# Patient Record
(demographics unavailable — no encounter records)

---

## 2024-10-09 NOTE — PHYSICAL EXAM
[No Acute Distress] : in no acute distress [Well developed] : well developed [Well Nourished] : ~L well nourished [Good Hygeine] : demonstrates good hygeine [Oriented x3] : oriented to person, place, and time [Normal Mood/Affect] : mood and affect are normal [Anxiety] : patient is anxious [Warm and Dry] : was warm and dry to touch [Normal Gait] : gait was abnormal [Normal] : was normal [Atrophy] : atrophy [de-identified] : ambulates with a cane [de-identified] : bilateral white plaques

## 2024-10-09 NOTE — HISTORY OF PRESENT ILLNESS
[FreeTextEntry1] : Derian is an 85-year-old with known history of recurrent UTI on Methenamine and Vitamin C.  At the time of her initial appointment in September 2023 it had been recommended she start transvaginal estrogen treatment; however, she ever did.  Derian presents today for follow-up with complaints of 1 day of mild dysuria.  She has baseline external pruritis/burning and as per GYN note in April 2023, has been diagnosed with lichen sclerosis.  No fevers or chills.

## 2024-10-09 NOTE — PROCEDURE
[Straight Catheterization] : insertion of a straight catheter [Urinary Tract Infection] : a urinary tract infection [Patient] : the patient [Allergies Reviewed] : Allergies reviewed [None] : none [___ Fr Straight Tip] : a [unfilled] in [place] straight tip catheter [Cloudy] : cloudy [Culture] : culture [Urinalysis] : urinalysis [No Complications] : no complications

## 2024-10-09 NOTE — DISCUSSION/SUMMARY
[FreeTextEntry1] : #Suspected UTI: -Udip with trace blood, moderate leukocytes -Follow-up formal UCx -Given symptoms and positive Udip, will treat empirically with 5-day course of Nitrofurantoin.  Allergies reviewed. -Signs and symptoms of worsening UTI reviewed.  These include but are not limited to fevers, chills, blood in the urine, and flank pain.  #Recurrent UTI: -Continue with Methenamine and Vitamin C BID -Resume estrogen tablet use; written instructions provided -She will hold the Methenamine while undergoing acute UTI treatment.  OK to continue the vitamin C.  #Lichen Sclerosis: -Rx for Clobetasol Propionate 0.05% external ointment sent to the patient's pharmacy on file.  She will apply this cream to the affected are for 12 weeks then as needed thereafter.   -Patient will RTO in 12 weeks for recheck; if no improvement, will consider vulvar biopsy.  All questions answered to the patient's satisfaction.  She expressed understanding. She knows to contact the office with any questions or concerns.

## 2024-10-09 NOTE — PHYSICAL EXAM
[No Acute Distress] : in no acute distress [Well developed] : well developed [Well Nourished] : ~L well nourished [Good Hygeine] : demonstrates good hygeine [Oriented x3] : oriented to person, place, and time [Normal Mood/Affect] : mood and affect are normal [Anxiety] : patient is anxious [Warm and Dry] : was warm and dry to touch [Normal Gait] : gait was abnormal [Normal] : was normal [Atrophy] : atrophy [de-identified] : ambulates with a cane [de-identified] : bilateral white plaques

## 2024-10-09 NOTE — PROCEDURE
[Straight Catheterization] : insertion of a straight catheter [Urinary Tract Infection] : a urinary tract infection [Patient] : the patient [Allergies Reviewed] : Allergies reviewed [None] : none [___ Fr Straight Tip] : a [unfilled] in [place] straight tip catheter [Cloudy] : cloudy [Culture] : culture [Urinalysis] : urinalysis [No Complications] : no complications

## 2024-10-10 NOTE — DISCUSSION/SUMMARY
[de-identified] : I went over the pathophysiology of the patient's symptoms in great detail with the patient. I discussed the underlying pathophysiology of the patient's condition in great detail with the patient. I went over the patient's x-rays with them in great detail. I informed her that injections such as cortisone and Viscosupplementation are short term solutions, and that surgery will be required to provide them long term relief from their symptoms. I informed the patient that I no longer operate. I provided the names of Dr. Luis Daniel Doyle, Dr. Velez and Dr. Coffey.  All of their questions were answered. They understand and consent to the plan.   FU as needed.   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 Based upon the patient's continued symptoms and failure to respond to conservative treatment I have recommended a total knee replacement for this patient. A long discussion took place with the patient describing what a total joint replacement is and what the procedure would entail. A total knee model, similar to the implant that will be used during the operation was utilized to demonstrate and to discuss the various bearing surfaces of the implants. The hospitalization and post-operative care and rehabilitation were also discussed. The use of perioperative antibiotics and DVT prophylaxis were discussed. The risk, benefits and alternatives to a surgical intervention were discussed at length with the patient. The patient was also advised of risks related to the medical comorbidities and elevated body mass index (BMI). A lengthy discussion took place to review the most common complications including but not limited to: deep vein thrombosis, pulmonary embolus, heart attack, stroke, infection, wound breakdown, numbness, damage to nerves, tendon, muscles, arteries or other blood vessels, death and other possible complications from anesthesia. The patient was told that we will take steps to minimize these risks by using sterile technique, antibiotics and DVT prophylaxis when appropriate and follow the patient postoperatively in the office setting to monitor progress. The possibility of recurrent pain, no improvement in pain and actual worsening of pain were also discussed with the patient.  The discharge plan of care focused on the patient going home following surgery. I encouraged the patient to make the necessary arrangements to have someone stay with them when they are discharged home. Following discharge, a home care nurse will visit the patient. They will open your home care case and request home physical therapy services. Home physical therapy will commence following discharge provided it is appropriate and covered by her health insurance benefit plan.  The risks, benefits and alternative treatment options of total joint replacement were reviewed with the patient at great length. All questions were answered to the satisfaction of the patient. The patient participated in an interactive discussion of the total knee replacement implant planned for their surgery with questions answered. The patient agreed with the treatment plan, and has decided to move forward with the elective total knee replacement as planned.  TAN HARTMANN was seen face to face and needs a commode for bedside use as the patient has no ability to acces the bathroom in their home. The patient also requires a rolling walker as this is needed for activities of daily living within their home secondary to the diagnosis of osteoarthritis.

## 2024-10-10 NOTE — END OF VISIT
Detail Level: Zone [Time Spent: ___ minutes] : I have spent [unfilled] minutes of time on the encounter which excludes teaching and separately reported services.

## 2024-10-10 NOTE — ADDENDUM
[FreeTextEntry1] : I, ELENA JAMISON, acted solely as a scribe for Dr. Delgado Asencio on this date 10/10/2024.  All medical record entries made by the Scribe were at my, Dr. Delgado Asencio, direction and personally dictated by me on 10/10/2024. I have reviewed the chart and agree that the record accurately reflects my personal performance of the history, physical exam, assessment and plan. I have also personally directed, reviewed, and agreed with the chart.

## 2024-10-10 NOTE — HISTORY OF PRESENT ILLNESS
[de-identified] : 85 year old RHD female, presenting for initial evaluation with left knee pain x 3-4 years. Patient reports gradual onset of progressive worsening intermittent, non-radiating left knee pain, localized to the lateral aspect of her knee, described as "sharp" and rated 10/10 on the pain scale. Patient denies any associated swelling and buckling. Patient states that knee pain is worst with standing from a seated position and walking. Patient admits to taking Extra-Strength Tylenol, which provides pain relief. Patient denies any recent falls, trauma, or other injuries that precipitated the onset of worsening knee pain. Of note, patient had prior right knee meniscus repair with Dr. Asencio in 2018. Patient was previously advised will need left knee surgery for "bone on bone", however , she opted to continue conservative management at the time. Patient denies prior left knee surgery or intra-articular injections. Patient is not participating in PT at this time. Patient ambulates with a can for assistance and can perform ADL's independently. Patient denies any fever, chills, headache, dizziness, shortness of breath, chest pain, palpitations, abdominal pain, new onset urinary/bowel incontinence, saddle paresthesia, or other acute complaints at this time.

## 2024-10-10 NOTE — PHYSICAL EXAM
[de-identified] : Constitutional o Appearance : well-nourished, well developed, alert, in no acute distress  Head and Face o Head :  Inspection : atraumatic, normocephalic o Face :  Inspection : no visible rash or discoloration Respiratory o Respiratory Effort: breathing unlabored  Neurologic o Mental Status Examination :  Orientation : alert and oriented X 3 Psychiatric o Mood and Affect: mood normal, affect appropriate Cardiovascular o Observation/Palpation : - no swelling Lymphatic o Additional Nodes : No palpable lymph nodes present  Right Lower Extremity o Buttock : no tenderness, swelling or deformities  o Right Hip :  Inspection/Palpation : no tenderness, swelling or deformities  Range of Motion : full and painless in all planes, no crepitance  Stability : joint stability intact  Strength : extension, flexion, adduction, abduction, internal rotation and external rotation 5/5   o Right Knee :  Inspection/Palpation : no tenderness to palpation, no swelling  Range of Motion : active flexion and extension full and painless, no crepitance  Stability : no valgus or varus instability present on provocative testing  Strength : flexion and extension 5/5  Tests and Signs : negative Anterior Drawer, negative Lachman, negative Ayde  Left Lower Extremity o Buttock : no tenderness, swelling or deformities  o Left Hip :  Inspection/Palpation : no tenderness, no swelling or deformities  Range of Motion : full and painless in all planes, no crepitance  Stability : joint stability intact  Strength : extension, flexion, adduction, abduction, internal rotation and external rotation 5/5  o Left Knee :  Inspection/Palpation : medial compartment tenderness and lateral compartment tenderness to palpation, no swelling  Range of Motion : 5-130 lacks full extension no crepitance  Stability : no valgus or varus instability present on provocative testing  Strength : flexion and extension 5/5  Tests and Signs : negative Anterior Drawer, negative Lachman, negative Ayde  Gait and Station: Gait: varus deformity of both knees,  Patient presents ambulating with a cane, no significant extremity swelling or lymphedema, good proprioception and balance  Radiology Results 10/10/2024  o Left Knee : Standing AP, lateral, tunnel, and skyline views of the knee were obtained and reveal bone on bone in the medial compartment with severe patellofemoral arthritis

## 2024-10-18 NOTE — PLAN
[FreeTextEntry1] : 86 y/o  postmenopausal F presents for annual  HCM f/u pap mammo and DEXA scan referral given   Vulvar itching Describing importance of picking up medication Encouraged her to use medication and if still has symptoms to return for vulvar biopsy  Patient screened for depression- no signs of clinical depression. PHQ-9 scores reviewed over the course of the visit 5-10 minutes of face to face time. Follow up with changes in mood including other symptoms of anxiety

## 2024-10-18 NOTE — HISTORY OF PRESENT ILLNESS
[Patient reported mammogram was normal] : Patient reported mammogram was normal [Patient reported PAP Smear was normal] : Patient reported PAP Smear was normal [Patient reported colonoscopy was normal] : Patient reported colonoscopy was normal [Post-Menopause, No Sxs] : post-menopausal, currently without symptoms [Menopause Age: ____] : age at menopause was [unfilled] [Previously active] : previously active [FreeTextEntry1] : 86 y/o  postmenopausal F presents for annual  Complaints of itching on vagina reviewed patients chart, was prescribed clobetasol 1 year ago, did not  prescription Urogyn gave her new prescription, she has not picked it up yet  POB: c/s x3 [Mammogramdate] : 2023 [PapSmeardate] : 2025 [TextBox_37] : does not recall [ColonoscopyDate] : 2019

## 2024-10-18 NOTE — PHYSICAL EXAM
[Chaperone Declined] : Patient declined chaperone [[id number]] : A chaperone was present during the pelvic exam. [Appropriately responsive] : appropriately responsive [Alert] : alert [No Acute Distress] : no acute distress [Soft] : soft [Non-tender] : non-tender [Non-distended] : non-distended [Oriented x3] : oriented x3 [Examination Of The Breasts] : a normal appearance [No Masses] : no breast masses were palpable [Labia Majora] : normal [Labia Minora] : normal [Normal] : normal [Uterine Adnexae] : normal [FreeTextEntry1] : vulvar white areas around the clitoral waterman

## 2024-10-19 NOTE — DISCUSSION/SUMMARY
[de-identified] : 30 minutes was spent ordering tests, reviewing past office notes, examining the patient, discussing the clinical presentation and findings, communicating and explaining the diagnosis, ordering medication, providing orthopedic education and documenting the visit in the electronic medical record.  The x-rays taken today reviewed with the patient and her .  She does have end-stage osteoarthritis.  She has failed to manage her quality of life as she needs despite the conservative treatment.  At this time she would really recommended a left knee replacement.  The patient will need medical and cardiac clearance.  She previously had atrial fibrillation but had a watchman's.  She is now on aspirin for VTE prevention.  The patient is a 85 year old individual with end stage arthritis of their left knee joint. Based upon the patient's continued symptoms and failure to respond to conservative treatment for more than three months including corticosteroid injections, viscosupplementation and acetaminophen and activity modification, I have recommended a left total knee arthroplasty for this patient. A long discussion took place with the patient describing what a total joint replacement is and what the procedure would entail. A knee model similar to the implants that will be used during the operation was utilized to demonstrate and to discuss the implants. Implant fixation, use of cement, was also discussed with the patient. Choices of implant manufactures were discussed and reviewed with preference to be made by patient and surgeon prior to operation. The patient was informed that the primary implant company would be [employer]. If the patient wishses to have another implant company used, the patient will obtain a consultation from an orthopedic surgeon utilizing that brand implant.  Final selection to be based on customary practice as well as preoperative templating with selection confirmed intraoperatively based on patients anatomy. The patient participated and agreed with the decision-making process. The hospitalization and post-operative care and rehabilitation were also discussed. The use of perioperative antibiotics and DVT prophylaxis were discussed. The risk, benefits and alternatives to a surgical intervention were discussed at length with the patient. The patient was also advised of risks related to the medical comorbidities and elevated body mass index (BMI).  A lengthy discussion took place to review the most common complications including but not limited to: deep vein thrombosis, pulmonary embolus, heart attack, stroke, infection, wound breakdown, numbness, damage to nerves, tendon, muscles, arteries or other blood vessels, death and other possible complications from anesthesia. The patient was told that we will take steps to minimize these risks by using sterile technique, antibiotics and DVT prophylaxis when appropriate and follow the patient postoperatively in the office setting to monitor progress. The possibility of recurrent pain, no improvement in pain and actual worsening of pain were also discussed with the patient. The discharge plan of care focused on the patient going home following surgery.  The patient was encouraged to make the necessary arrangements to have someone stay with them when they are discharged home.  Following discharge, a home care nurse will visit the patient.  The home care nurse will open your home care case and request home physical therapy services.  Home physical therapy will commence following discharge provided it is appropriate and covered by the health insurance benefit plan.  The benefits of surgery were discussed with the patient including the potential for improving his/her current clinical condition through operative intervention. Alternatives to surgical intervention including continued conservative management were also discussed in detail. All questions were answered to the satisfaction of the patient. The treatment plan of care, as well as a model of a total knee equivalent to the one that will be used for their total joint replacement, was shared with the patient.  The patient participated and agreed to the plan of care as well as the use of the recommended implants for their total knee replacement surgery.

## 2024-10-19 NOTE — HISTORY OF PRESENT ILLNESS
[de-identified] : Patient presents today with  for evaluation of left knee pain.  The patient reports of having knee pain for well over 3 years now.  She reports of treatment with corticosteroid and gel injections in the past.  She reports last set of injections were approximately since months ago.  She reports of increasing knee pain despite conservative treatment.  The patient does take Tylenol as needed for pain control.  She reports of difficulties with ambulating and navigating stairs.  She has difficulties with walking long distances and can no longer shop regularly.  She is not dependent upon a cane.  She is here today to discuss knee arthroplasty surgery.  She does report of a past history of atrial fibrillation and has had a Watchman procedure.  She is currently on aspirin 81 mg daily.  Review of Systems- Constitutional: No fever or chills.  Cardiovascular: No orthopnea or chest pain Pulmonary: No shortness of breath.  GI: No nausea or vomiting or abdominal pain. Musculoskeletal: see HPI  Psychiatric: No anxiety and depression.

## 2024-10-19 NOTE — PHYSICAL EXAM
[de-identified] : The patient is conversive and in no apparent distress. The patient is alert and oriented to person, place, and time. Affect and mood appear normal. The head is normocephalic and atraumatic. Skin shows normal turgor with no evidence of eczema or psoriasis. No respiratory distress noted. Sensation grossly intact. MUSCULOSKELETAL:   SEE BELOW  Left knee exam demonstrates skin is clean, dry intact.  No surgical scars.  No signs of acute trauma.  Small effusion.  Normal temperature.  -3 degree varus alignment.  No flexion contracture.  Motion 0 to 115 degrees of flexion.  There is patellofemoral crepitus with terminal flexion.  There is moderate laxity with stress testing.  Some mild venous stasis as well as varicose veins distally.  No open wounds.  No signs of active infection.  2+ DP pulse.  Normal sensation to light touch. [de-identified] : Three-view left knee x-rays were reviewed.  Severe end-stage osteoarthritis of the left knee.

## 2024-10-19 NOTE — END OF VISIT
[FreeTextEntry4] :  I, Sharad Carrasco, acted solely as a scribe for Dr. Luis Daniel Doyle on 10/19/2024. [FreeTextEntry3] :  I, Luis Daniel Doyle, on 10/19/2024 personally performed the services described in the documentation, reviewed the documentation recorded by the scribe in my presence, and it accurately and completely records my words and actions.

## 2024-12-19 NOTE — HISTORY OF PRESENT ILLNESS
[___ Weeks Post Op] : [unfilled] weeks post op [4] : the patient reports pain that is 4/10 in severity [Chills] : no chills [Constipation] : no constipation [Diarrhea] : no diarrhea [Dysuria] : no dysuria [Fever] : no fever [Nausea] : no nausea [Vomiting] : no vomiting [Clean/Dry/Intact] : clean, dry and intact [Erythema] : not erythematous [Discharge] : absent of discharge [Swelling] : swollen [Dehiscence] : not dehisced [Neuro Intact] : an unremarkable neurological exam [Vascular Intact] : ~T peripheral vascular exam normal [Negative Stefan's] : maneuvers demonstrated a negative Stefan's sign [Xray (Date:___)] : [unfilled] Xray -  [Hardware in Good Position] : hardware in good position [No Obvious Fractures] : no obvious fractures [Good Overall Alignment] : good overall alignment [Doing Well] : is doing well [No Sign of Infection] : is showing no signs of infection [Adequate Pain Control] : has adequate pain control [de-identified] : Left TKR 12/2/24 The patient is an 86 yr old female presents today for her first post operative visit and dermabond tape removal. She is s/p Left total Knee Replacement performed on 12/2/2024 at [facility]. [de-identified] : Patient was discharged home with Home care service. She presents with her spouse for evaluation. She is doing well ambulating with a walker. She is receiving home physical therapy and performing home exercises. The patient reports pain of 4/10 She is taking oxycodone for analgesia relief on an intermittent basis. She continues to use Tylenol, applying ice and elevating the lower extremity. Patient is using enteric coated aspirin twice a day for DVT prophylaxis and prevention until 4 weeks post operative. The patient is using Meloxicam for anti-inflammatory and pain modality. [de-identified] : Knee exam: Swelling : mild Effusion : none Alignment :  Neutral  Tenderness : mild                 Incision : Left knee incision with dermabond tape intact Skin temperature : warm & dry Range of motion: Flexion 95  degrees; Extension 0 degrees Laxity A-P : < 5 mm  Laxity M-L:   < 5 deg  Patellofemoral crepitus: none Quadriceps formation: Intact Quad /Ham Strength: 5/5   [de-identified] : Radiographs, 3 views, of the left knee was obtained at today's visit. X-rays reveal a well-positioned, well fixed total knee replacement in good alignment. There is no obvious evidence of fracture, dislocation or osteolysis.   [de-identified] : Dermabond tape was removed from the left knee incision and replaced with Steri-Strips utilizing sterile technique. Patient tolerated this well. Incisional care was reviewed with the patient. Patient was advised on the nature of the healing process and on the importance of adhering to the DVT prophylaxis with aspirin for a total of 4 weeks post operative. Patient is to continue with physical therapy and home exercises as recommended. Prescription was provided for outpatient physical therapy. Patient was encouraged to engage in isometric exercises to assist the knee to come to full extension. She was advised to continue to apply ice to the knee and keep the left lower extremity elevated above the level of the heart to decrease swelling. Prescription was given for antibiotics Azithromycin for dental prophylaxis as per Dr. Doyle's protocol. Patient will continue to use Tylenol and/or Oxycodone as needed for analgesic relief. Patient is to make a follow up appointment to see Dr. Doyle in 6 weeks. She was educated on the signs and symptoms of infection to report. Patient verbalized understanding of all instructions and all of her questions were addressed to her satisfaction. Patient was advised to call this office if she has further questions or concerns.  My cumulative time spent on this patient's visit included: Preparation for the visit, review of the medical records, review of pertinent diagnostic studies, examination and counseling of the patient on the above diagnosis, treatment plan and prognosis, orders of diagnostic tests, medications and/or appropriate procedures and documentation in the medical records of today's visit. Not including time spent for procedures.

## 2025-01-28 NOTE — HISTORY OF PRESENT ILLNESS
[___ Months Post Op] : [unfilled] months post op [0] : no pain reported [Clean/Dry/Intact] : clean, dry and intact [Healed] : healed [Swelling] : swollen [Negative Stefan's] : maneuvers demonstrated a negative Stefan's sign [Xray (Date:___)] : [unfilled] Xray -  [Hardware in Good Position] : hardware in good position [Good Overall Alignment] : good overall alignment [Doing Well] : is doing well [Excellent Pain Control] : has excellent pain control [No Sign of Infection] : is showing no signs of infection [Erythema] : not erythematous [de-identified] : Left TKR 12/2/24 [de-identified] : Patient is happy with the progress of her left total knee replacement.  Patient has been doing home physical therapy for the past 2 months. [de-identified] : Left total knee replacement with a well-healed midline surgical incision with no signs of cellulitis. Full extension of 0 degrees and flexion to 110 degrees [de-identified] : Radiographs 3 views obtained of the Left total knee replacement were performed today. There are stable interfaces between bone and implants of the femur, tibia and patella. Alignment of the femur with the tibia are good, and the alignment of the patella to the femoral groove are well aligned. There is no obvious fracture,. [de-identified] : .Patient and her  present in the exam room preferred to discontinue with physical therapy. Patient is encouraged to do the home exercise program

## 2025-01-28 NOTE — HISTORY OF PRESENT ILLNESS
[___ Months Post Op] : [unfilled] months post op [0] : no pain reported [Clean/Dry/Intact] : clean, dry and intact [Healed] : healed [Swelling] : swollen [Negative Stefan's] : maneuvers demonstrated a negative Stefan's sign [Xray (Date:___)] : [unfilled] Xray -  [Hardware in Good Position] : hardware in good position [Good Overall Alignment] : good overall alignment [Doing Well] : is doing well [Excellent Pain Control] : has excellent pain control [No Sign of Infection] : is showing no signs of infection [Erythema] : not erythematous [de-identified] : Left TKR 12/2/24 [de-identified] : Patient is happy with the progress of her left total knee replacement.  Patient has been doing home physical therapy for the past 2 months. [de-identified] : Left total knee replacement with a well-healed midline surgical incision with no signs of cellulitis. Full extension of 0 degrees and flexion to 110 degrees [de-identified] : Radiographs 3 views obtained of the Left total knee replacement were performed today. There are stable interfaces between bone and implants of the femur, tibia and patella. Alignment of the femur with the tibia are good, and the alignment of the patella to the femoral groove are well aligned. There is no obvious fracture,. [de-identified] : .Patient and her  present in the exam room preferred to discontinue with physical therapy. Patient is encouraged to do the home exercise program